# Patient Record
Sex: FEMALE | Race: WHITE | NOT HISPANIC OR LATINO | URBAN - METROPOLITAN AREA
[De-identification: names, ages, dates, MRNs, and addresses within clinical notes are randomized per-mention and may not be internally consistent; named-entity substitution may affect disease eponyms.]

---

## 2020-11-27 ENCOUNTER — EMERGENCY (EMERGENCY)
Facility: HOSPITAL | Age: 25
LOS: 1 days | Discharge: HOME | End: 2020-11-27
Attending: EMERGENCY MEDICINE | Admitting: EMERGENCY MEDICINE
Payer: COMMERCIAL

## 2020-11-27 VITALS
SYSTOLIC BLOOD PRESSURE: 105 MMHG | RESPIRATION RATE: 18 BRPM | HEART RATE: 89 BPM | DIASTOLIC BLOOD PRESSURE: 65 MMHG | OXYGEN SATURATION: 100 % | TEMPERATURE: 98 F

## 2020-11-27 PROCEDURE — 99282 EMERGENCY DEPT VISIT SF MDM: CPT

## 2020-11-27 NOTE — ED PROVIDER NOTE - MUSCULOSKELETAL, MLM
Spine appears normal, range of motion is not limited, no muscle or joint tenderness. FROM of left 3rd finger. + distal pulses.

## 2020-11-27 NOTE — ED PROVIDER NOTE - PROGRESS NOTE DETAILS
Dr. Swanson: Plastics is here who was called prior to arrival by pt for wound repair. Dr. Swanson: Laceration was closed by Plastics and f/u given to pt. Dr. Swanson: Laceration was closed by Plastics, Dr. Gonsales and f/u given to pt.

## 2020-11-27 NOTE — ED PROVIDER NOTE - OBJECTIVE STATEMENT
23 y/o female with no significant PMhx who presented to the ED for injury to left 3rd finger today. Pt states she was opening a box and cut her left 3rd finger on plastic. Pt denies any fever, chills, nausea, vomiting, SOB, chest pain, or abd pain. Bleeding controlled with pressure. Pt last Tdap UTD.

## 2020-11-27 NOTE — ED PROVIDER NOTE - NSFOLLOWUPINSTRUCTIONS_ED_ALL_ED_FT
Rest and plenty of fluids.    Keep area dry and clean.  Follow up with Plastics as discussed.     Return for any increased swelling, redness, or discharge. Rest and plenty of fluids.    Keep area dry and clean.  Follow up with [facility], as discussed.     Return for any increased swelling, redness, or discharge.

## 2020-11-27 NOTE — ED PROVIDER NOTE - CLINICAL SUMMARY MEDICAL DECISION MAKING FREE TEXT BOX
25 y/o female with no significant PMhx who presented to the ED for injury to left 3rd finger today.   Concern for left 3rd finger laceration  Lac Repair by plastics

## 2020-11-27 NOTE — ED PROVIDER NOTE - PATIENT PORTAL LINK FT
You can access the FollowMyHealth Patient Portal offered by [facility] by registering at the following website: [email or web address]. By joining [employer] [facility]’s FollowMyHealth portal, you will also be able to view your health information using other applications (apps) compatible with our system.

## 2020-11-27 NOTE — ED PROVIDER NOTE - FAMILY HISTORY
----- Message from Christel Villela sent at 4/21/2020 11:39 AM CDT -----  Contact: patient  Please call above patient at [phone] said she would like to speak with the nurse concerning surgery area not healing properly looks infected waiting on a call from the nurse thanks.   No pertinent family history in first degree relatives

## 2020-11-27 NOTE — ED ADULT TRIAGE NOTE - CHIEF COMPLAINT QUOTE
s/p laceration to left 3rd digit with serrated plastic knife.  Positive laceration approximately 1 1/2 inch noted.  Needs Adacel